# Patient Record
Sex: MALE | URBAN - METROPOLITAN AREA
[De-identification: names, ages, dates, MRNs, and addresses within clinical notes are randomized per-mention and may not be internally consistent; named-entity substitution may affect disease eponyms.]

---

## 2017-08-25 ENCOUNTER — IMPORTED ENCOUNTER (OUTPATIENT)
Dept: URBAN - METROPOLITAN AREA CLINIC 1 | Facility: CLINIC | Age: 52
End: 2017-08-25

## 2017-08-25 NOTE — PATIENT DISCUSSION
[facility] Good [facility]. Patient schedule PRK on 09/08/17. Ocuflox and Durezol sent to [facility]. Patient given Durezol coupon and RX for Vicodin and Valium. Patient states he will apply for Care Credit.

## 2017-09-08 ENCOUNTER — IMPORTED ENCOUNTER (OUTPATIENT)
Dept: URBAN - METROPOLITAN AREA CLINIC 1 | Facility: CLINIC | Age: 52
End: 2017-09-08

## 2017-09-11 ENCOUNTER — IMPORTED ENCOUNTER (OUTPATIENT)
Dept: URBAN - METROPOLITAN AREA CLINIC 1 | Facility: CLINIC | Age: 52
End: 2017-09-11

## 2017-09-11 PROBLEM — Z09: Noted: 2017-09-11

## 2017-09-11 PROBLEM — Z98.89: Noted: 2017-09-11

## 2017-09-11 NOTE — PATIENT DISCUSSION
POD #3 [address] OU- doing well. Replaced BCL OS w/ -0.50 Acuvue Hydraluxe 1-day. Durezol BID OUOcuflox BID OUExpressed the importance tears. Start ATs QID OUBCL in place OU patient should return later this week for BCL removal by [facility]. Return for an appointment for thursday for PO (remove BCL) with Dr. Hui Garcia.

## 2017-09-14 ENCOUNTER — IMPORTED ENCOUNTER (OUTPATIENT)
Dept: URBAN - METROPOLITAN AREA CLINIC 1 | Facility: CLINIC | Age: 52
End: 2017-09-14

## 2017-09-14 PROBLEM — Z98.89: Noted: 2017-09-14

## 2017-09-14 NOTE — PATIENT DISCUSSION
1. POW #1 [address] OU - doing well. Removed BCL OU. Never received antibiotic. Continue Durezol BID OU until goneContinue Tears QID OU2. Corneal Ulcer OD- Start Besivance OS BID (3 samples gone.) If OD becomes red and/or painful advised pt to return to office immediately. 3.  Return for an appointment for PO PRK OU in 3 weeks with Dr. Hui Garcia.

## 2017-09-15 ENCOUNTER — IMPORTED ENCOUNTER (OUTPATIENT)
Dept: URBAN - METROPOLITAN AREA CLINIC 1 | Facility: CLINIC | Age: 52
End: 2017-09-15

## 2017-09-15 PROBLEM — H04.123: Noted: 2017-09-15

## 2017-09-15 NOTE — PATIENT DISCUSSION
1. Dry Eyes OU - Begin ATs Q1hr OU Routinely. Begin Systane Gel QHS OU. 2. S/p PRK OU 3. Resolved Corneal Ulcer OD - Cont Besivance BID OS till samples out. Discussed with patient will not give any po narcotic pain medication. Advised patient to d/c drinking any alcohol till seen in October. Alcohol cessation discussed. F/u in October.

## 2022-04-02 ASSESSMENT — VISUAL ACUITY
OS_SC: 20/25
OD_CC: 20/30
OS_SC: 20/150
OD_SC: 20/20
OD_CC: J1+
OD_SC: 20/60
OD_CC: 20/30
OS_CC: 20/25
OS_CC: J1
OD_PH: SC 20/25
OS_CC: 20/25-2
OS_PH: SC 20/50

## 2022-04-02 ASSESSMENT — KERATOMETRY
OD_K1POWER_DIOPTERS: 44.00
OS_K1POWER_DIOPTERS: 44.00
OS_K2POWER_DIOPTERS: 44.50
OD_K2POWER_DIOPTERS: 45.00
OD_AXISANGLE2_DEGREES: 111
OS_AXISANGLE2_DEGREES: 061

## 2022-04-02 ASSESSMENT — TONOMETRY
OD_IOP_MMHG: 16
OS_IOP_MMHG: 16